# Patient Record
Sex: FEMALE | Race: OTHER | HISPANIC OR LATINO
[De-identification: names, ages, dates, MRNs, and addresses within clinical notes are randomized per-mention and may not be internally consistent; named-entity substitution may affect disease eponyms.]

---

## 2023-08-30 ENCOUNTER — APPOINTMENT (OUTPATIENT)
Dept: ORTHOPEDIC SURGERY | Facility: CLINIC | Age: 58
End: 2023-08-30
Payer: COMMERCIAL

## 2023-08-30 VITALS — BODY MASS INDEX: 35.85 KG/M2 | WEIGHT: 210 LBS | HEIGHT: 64 IN

## 2023-08-30 DIAGNOSIS — M17.0 BILATERAL PRIMARY OSTEOARTHRITIS OF KNEE: ICD-10-CM

## 2023-08-30 PROBLEM — Z00.00 ENCOUNTER FOR PREVENTIVE HEALTH EXAMINATION: Status: ACTIVE | Noted: 2023-08-30

## 2023-08-30 PROCEDURE — 99203 OFFICE O/P NEW LOW 30 MIN: CPT

## 2023-08-30 PROCEDURE — 73562 X-RAY EXAM OF KNEE 3: CPT | Mod: 50

## 2023-08-30 RX ORDER — NABUMETONE 500 MG/1
500 TABLET, FILM COATED ORAL
Qty: 60 | Refills: 2 | Status: ACTIVE | COMMUNITY
Start: 2023-08-30 | End: 1900-01-01

## 2023-08-31 NOTE — HISTORY OF PRESENT ILLNESS
[de-identified] : Patient is presenting for bilateral knee discomfort.  She reports a history of several years of discomfort but more recently over the last few weeks to months Medical Hx: Graves Disease Aggravating FX: walking / Running / bending  SYmptoms: Shooting Pain /  Pain Level: 4/10 Alleviating FX: Rest  Pain MED: Allergies:

## 2023-08-31 NOTE — ASSESSMENT
[FreeTextEntry1] : Discussed at length with patient underlying arthritis as well as treatment options and at this time patient elects home exercises therapy anti-inflammatory and observation discussed if no improvement ultimately consideration to injections and that if these measures all failed provide significant relief she may benefit from a total knee replacement

## 2023-08-31 NOTE — PHYSICAL EXAM
[de-identified] : Bilateral knee  Constitutional:  The patient is healthy-appearing and in no apparent distress.   Gait: The patient ambulates with a normal gait and no limp.  Cardiovascular System:  The capillary refill is less than 2 seconds.   Skin:  There are no skin abnormalities.  Right Knee:   Bony Palpation:  There is tenderness of the medial joint line.  There is no tenderness of the lateral joint line. There is tenderness of the medial femoral chondyle. There is no tenderness of the lateral femoral chondyle. There is no tenderness of the tibial tubercle. There is no tenderness of the superior patella. There is no tenderness of the inferior patella. There is tenderness of the medial patellar facet. There is tenderness of the lateral patellar facet.  Soft Tissue Palpation:  There is no tenderness of the medial retinaculum. There is no tenderness of the lateral retinaculum. There is no tenderness of the quadriceps tendon. There is no tenderness of the patella tendon. There is no tenderness of the ITB. There is no tenderness of the pes anserine.  Active Range of Motion:  The range of motion at the knee actively and passively is full.   Special Tests:  There is a negative Apley. There is a negative Steinmanns.  There is a negative Lachman and Anterior Drawer. There is a negative Posterior Drawer.   There is no varus or valgus laxity.  Strength:  There is 5/5 hip flexion and 5/5 knee flexion and extension.    Left Knee:   Bony Palpation:  There is tenderness of the medial joint line.  There is no tenderness of the lateral joint line. There is tenderness of the medial femoral chondyle. There is no tenderness of the lateral femoral chondyle. There is no tenderness of the tibial tubercle. There is no tenderness of the superior patella. There is no tenderness of the inferior patella. There is tenderness of the medial patellar facet. There is tenderness of the lateral patellar facet.  Soft Tissue Palpation:  There is no tenderness of the medial retinaculum. There is no tenderness of the lateral retinaculum. There is no tenderness of the quadriceps tendon. There is no tenderness of the patella tendon. There is no tenderness of the ITB. There is no tenderness of the pes anserine.  Active Range of Motion:  The range of motion at the knee actively and passively is full.   Special Tests:  There is a negative Apley. There is a negative Steinmanns.  There is a negative Lachman and Anterior Drawer. There is a negative Posterior Drawer.   There is no varus or valgus laxity.  Strength:  There is 5/5 hip flexion and 5/5 knee flexion and extension.    Psychiatric:  The patient demonstrates a normal mood and affect and is active and alert  [de-identified] : X-ray bilateral knee: There is moderate medial and patellofemoral arthritis

## 2023-09-27 ENCOUNTER — APPOINTMENT (OUTPATIENT)
Dept: ORTHOPEDIC SURGERY | Facility: CLINIC | Age: 58
End: 2023-09-27

## 2024-01-12 ENCOUNTER — NON-APPOINTMENT (OUTPATIENT)
Age: 59
End: 2024-01-12

## 2024-01-18 ENCOUNTER — APPOINTMENT (OUTPATIENT)
Dept: ORTHOPEDIC SURGERY | Facility: CLINIC | Age: 59
End: 2024-01-18
Payer: COMMERCIAL

## 2024-01-18 VITALS — HEIGHT: 64 IN | WEIGHT: 210 LBS | BODY MASS INDEX: 35.85 KG/M2

## 2024-01-18 DIAGNOSIS — M77.12 LATERAL EPICONDYLITIS, LEFT ELBOW: ICD-10-CM

## 2024-01-18 PROCEDURE — 73070 X-RAY EXAM OF ELBOW: CPT | Mod: LT

## 2024-01-18 PROCEDURE — 99213 OFFICE O/P EST LOW 20 MIN: CPT

## 2024-01-22 PROBLEM — M77.12 LATERAL EPICONDYLITIS OF LEFT ELBOW: Status: ACTIVE | Noted: 2024-01-22

## 2024-01-22 NOTE — ASSESSMENT
[FreeTextEntry1] : Discussed at length with patient exam history and imaging at this time patient elects home exercises counterforce bracing and observation

## 2024-01-22 NOTE — HISTORY OF PRESENT ILLNESS
[de-identified] : Last visit:8/30/2023 Reason: Bilateral knee pain - much improved - Left feels weak New issue: Left elbow Reason:  no injury Duration: 11/2023 Aggravating: lifting heavy/ straightening arm/ carrying anything Symptoms: tingling in arm Pain level:2/10 Prior studies: x rays ordered

## 2024-01-22 NOTE — PHYSICAL EXAM
[de-identified] : Apparently is downstairs left elbow Constitutional:  The patient is healthy-appearing and in no apparent distress.   Cardiovascular System:  There is capillary refill less than 2 seconds.   Skin:  There is no skin abnormalities of elbow.  Left Elbow:  Appearance:  There is no deformity, induration, redness, swelling, or warmth and a normal carrying angle.   Bony Palpation:  There is no tenderness of the medial epicondyle. There is no tenderness of olecranon. There is no tenderness of the ulnatrochlea articulation. There is no tenderness of the coronoid process. There is no tenderness of the radial head. There is no tenderness of the radiocapitellar joint. There is tenderness of the lateral epicondyle.   Soft Tissue Palpation:  There is no tenderness of the ulnar nerve. There is no tenderness of the biceps insertion. There is no tenderness of the pronator teres. There is no tenderness of the flexor carpi ulnaris. There is no tenderness of the flexor carpi radialis. There is no tenderness of the annular ligament of the radius. There is no tenderness of the brachioradialis. There is no tenderness of the radial collateral ligament. There is no tenderness of the ulnar collateral ligament. There is no tenderness of the antecubital fossa. There is tenderness of the extensor carpi radialis brevis. There is tenderness of the extensor carpi radialis longus.  Range of Motion:   There is full range of motion both actively and passively.   Stability: There is no dislocation or laxity to testing.   Strength:  There is 5/5 elbow flexion, extension, supination and pronation.    Neurologic: There is normal sensation C5-T1 to light touch.   Psychiatric:  The patient demonstrates a normal mood and affect and is active and alert. [de-identified] : X-ray left elbow: There is no significant bony / soft tissue abnormality, arthritis, or fracture.

## 2024-05-21 ENCOUNTER — APPOINTMENT (OUTPATIENT)
Dept: ORTHOPEDIC SURGERY | Facility: CLINIC | Age: 59
End: 2024-05-21

## 2024-06-27 ENCOUNTER — APPOINTMENT (OUTPATIENT)
Dept: ORTHOPEDIC SURGERY | Facility: CLINIC | Age: 59
End: 2024-06-27
Payer: COMMERCIAL

## 2024-06-27 DIAGNOSIS — S50.02XA CONTUSION OF LEFT ELBOW, INITIAL ENCOUNTER: ICD-10-CM

## 2024-06-27 PROCEDURE — 73070 X-RAY EXAM OF ELBOW: CPT | Mod: LT

## 2024-06-27 PROCEDURE — 99213 OFFICE O/P EST LOW 20 MIN: CPT

## 2024-07-01 PROBLEM — S50.02XA CONTUSION OF LEFT ELBOW, INITIAL ENCOUNTER: Status: ACTIVE | Noted: 2024-07-01
